# Patient Record
Sex: MALE | Race: WHITE | NOT HISPANIC OR LATINO | URBAN - METROPOLITAN AREA
[De-identification: names, ages, dates, MRNs, and addresses within clinical notes are randomized per-mention and may not be internally consistent; named-entity substitution may affect disease eponyms.]

---

## 2018-01-14 NOTE — MISCELLANEOUS
Message   Recorded as Task   Date: 07/12/2016 11:26 AM, Created By: Jessica Mullins   Task Name: Follow Up   Assigned To: [facility] [place]   Regarding Patient: Truong Hairston, Status: Active   Comment:    Freddy Morris - 12 Jul 2016 11:26 AM     TASK CREATED  Caller: Self; General Medical Question; [phone] (Home)  s/w pt, states he received a post card from "[facility]" stating that he is eligible for a new back brace  pt states he was last seen in the office in 2014  Uses his brace as prescribed by this office for activities w/ + relief  Pt states his brace is getting a little worn - could probably use a new one, but not urgently  Advised pt, will d/w DG  An ov may be necessary  pt states he would need to schedule at a later date  advised pt, will d/w DG and cb to advise  Kin Manzo - 12 Jul 2016 11:36 AM     TASK REPLIED TO: Previously Assigned To [facility] [place] [facility],Team  It would all depend on his insurance coverage as it is typically every 3 years or so they will pay for new braces  I would say for now, keep using the one he has and if he feels he needs a new one, he will have to be seen in the office before we can provide the documentation necessary to justify a new brace  Freddy Morris - 12 Jul 2016 1:15 PM     TASK EDITED  s/w pt, advised of above  pt verbalized understanding and appreciation  Will c/b prn  Active Problems    1  Chronic low back pain (724 2,338 29) (M54 5,G89 29)   2  Herniated lumbar intervertebral disc (722 10) (M51 26)   3  Myositis (729 1)   4  Sciatic Radiculopathy (724 3)    Current Meds   1  Aspir-81 81 MG Oral Tablet Delayed Release; Therapy: (Recorded:01Apr2014) to Recorded   2  Losartan Potassium-HCTZ 50-12 5 MG Oral Tablet; Therapy: (Recorded:01Apr2014) to Recorded   3  Multivitamins Oral Capsule; Therapy: (Recorded:01Apr2014) to Recorded   4  Omega-3 Fish Oil 1200 MG Oral Capsule; Therapy: (Recorded:01Apr2014) to Recorded   5  Simvastatin 40 MG Oral Tablet; Therapy: (Recorded:01Apr2014) to Recorded   6  Zocor TABS (Simvastatin) Recorded    Allergies    1   Penicillin V Potassium TABS    Signatures   Electronically signed by : Aiden Cabrales, ; Jul 12 2016  1:15PM EST                       (Author)

## 2021-04-08 DIAGNOSIS — Z23 ENCOUNTER FOR IMMUNIZATION: ICD-10-CM

## 2021-06-01 ENCOUNTER — TELEPHONE (OUTPATIENT)
Dept: PAIN MEDICINE | Facility: CLINIC | Age: 76
End: 2021-06-01

## 2021-06-01 NOTE — TELEPHONE ENCOUNTER
Patient moved to [facility] & is requesting his Spine & Pain records be faxed to his new Spine & Pain specialist in [address]  [facility]  Fax records to information listed below:    [address],Third Floor Pain  Fax# [phone]  [place]# [phone]    Call back# [phone] (patient would like a call with status)

## 2021-06-03 ENCOUNTER — TELEPHONE (OUTPATIENT)
Dept: PAIN MEDICINE | Facility: CLINIC | Age: 76
End: 2021-06-03

## 2021-06-03 NOTE — TELEPHONE ENCOUNTER
Received signed release & faxed rcords to [email or web address] [facility] (printed from [employer])